# Patient Record
Sex: MALE
[De-identification: names, ages, dates, MRNs, and addresses within clinical notes are randomized per-mention and may not be internally consistent; named-entity substitution may affect disease eponyms.]

---

## 2019-12-27 ENCOUNTER — HOSPITAL ENCOUNTER (EMERGENCY)
Dept: HOSPITAL 97 - ER | Age: 19
Discharge: HOME | End: 2019-12-27
Payer: COMMERCIAL

## 2019-12-27 VITALS — SYSTOLIC BLOOD PRESSURE: 151 MMHG | OXYGEN SATURATION: 100 % | DIASTOLIC BLOOD PRESSURE: 85 MMHG | TEMPERATURE: 98.8 F
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
## 2019-12-27 DIAGNOSIS — S81.831A: Primary | ICD-10-CM

## 2019-12-27 PROCEDURE — 99283 EMERGENCY DEPT VISIT LOW MDM: CPT

## 2019-12-27 NOTE — ER
Nurse's Notes                                                                                     

 [facility]                                                                 

Name: Dusty Singh                                                                                 

Age: 19 yrs                                                                                       

Sex: Male                                                                                         

: 2000                                                                                   

MRN: [id number]                                                                                   

Arrival Date: 2019                                                                          

Time: 15:40                                                                                       

Account#: [id number]                                                                            

Bed 14                                                                                            

Private MD:                                                                                       

Diagnosis: Bitten by other marine mammals                                                         

                                                                                                  

Presentation:                                                                                     

                                                                                             

16:15 Presenting complaint: Patient states: cat fish mauricio to R calf around 1000 this morning  ch  

      while on a boat. Transition of care: patient was not received from another setting of       

      care. Onset of symptoms was 2019 at 10:00. Risk Assessment: Do you want to     

      hurt yourself or someone else? Patient reports no desire to harm self or others.            

      Initial Sepsis Screen: Does the patient meet any 2 criteria? No. Patient's initial          

      sepsis screen is negative. Does the patient have a suspected source of infection? No.       

      Patient's initial sepsis screen is negative. Care prior to arrival: None.                   

16:15 Method Of Arrival: Ambulatory                                                             

16:15 Acuity: TONA 3                                                                           ch  

                                                                                                  

Triage Assessment:                                                                                

16:16 General: Appears in no apparent distress. comfortable, Behavior is calm, cooperative,   ch  

      appropriate for age. Pain: Complains of pain in right calf and medial aspect of right       

      calf Pain currently is 4 out of 10 on a pain scale. Neuro: No deficits noted.               

                                                                                                  

Historical:                                                                                       

- Allergies:                                                                                      

16:16 No Known Allergies;                                                                       

- Home Meds:                                                                                      

16:16 None [Active];                                                                          ch  

- PMHx:                                                                                           

16:16 None;                                                                                   ch  

- PSHx:                                                                                           

16:16 None;                                                                                     

                                                                                                  

- Immunization history:: Adult Immunizations up to date, Flu vaccine is not up to date.           

- Social history:: Smoking status: Patient/guardian denies using tobacco, Patient uses            

  alcohol, occasionally. Patient/guardian denies using street drugs.                              

- Ebola Screening: : Patient negative for fever greater than or equal to 101.5 degrees            

  Fahrenheit, and additional compatible Ebola Virus Disease symptoms Patient denies               

  exposure to infectious person Patient denies travel to an Ebola-affected area in the            

  21 days before illness onset No symptoms or risks identified at this time.                      

                                                                                                  

                                                                                                  

Screenin:36 Abuse screen: Denies threats or abuse. Nutritional screening: No deficits noted.        tr5 

      Tuberculosis screening: No symptoms or risk factors identified. Fall Risk None              

      identified.                                                                                 

                                                                                                  

Assessment:                                                                                       

16:36 General: Appears in no apparent distress. Behavior is calm, cooperative, appropriate    tr5 

      for age. Pain: Complains of pain in right leg. Neuro: Level of Consciousness is awake,      

      alert, obeys commands, Oriented to person, place, time,  are equal bilaterally         

      Moves all extremities. Cardiovascular: Heart tones present Capillary refill < 3             

      seconds. Respiratory: Airway is patent Respiratory effort is even, unlabored,               

      Respiratory pattern is regular, symmetrical. GI: No signs and/or symptoms were reported     

      involving the gastrointestinal system. : No signs and/or symptoms were reported           

      regarding the genitourinary system. EENT: No signs and/or symptoms were reported            

      regarding the EENT system. Derm: Wound noted right leg. Musculoskeletal: No signs           

      and/or symptoms reported regarding the musculoskeletal system.                              

                                                                                                  

Vital Signs:                                                                                      

16:16  / 85; Pulse 92; Resp 16; Temp 98.8; Pulse Ox 100% on R/A; Weight 83.91 kg;       ch  

      Height 5 ft. 10 in. (177.80 cm); Pain 1/10;                                                 

16:16 Body Mass Index 26.54 (83.91 kg, 177.80 cm)                                               

                                                                                                  

ED Course:                                                                                        

15:40 Patient arrived in ED.                                                                  ag5 

15:50 Joseph Vogel PA is PHCP.                                                              [facility] 

15:50 Mathew Brunner MD is Attending Physician.                                              [facility] 

16:15 Triage completed.                                                                         

16:16 Arm band placed on left wrist. Patient placed in an exam room, on a stretcher.            

16:36 Dash Jennings, RN is Primary Nurse.                                                 tr5 

16:36 Awaiting for x-ray.                                                                     tr5 

16:36 Bed in low position. Call light in reach. Side rails up X 1.                            tr5 

16:50 Tib Fib Right XRAY In Process Unspecified.                                              EDMS

17:53 No provider procedures requiring assistance completed. Patient did not have IV access   tr5 

      during this emergency room visit.                                                           

                                                                                                  

Administered Medications:                                                                         

17:26 Drug: Lidocaine (1 %) 20 ml Volume: 20 ml; Route: Infiltration;                         tr5 

17:52 Drug: Doxycycline 100 mg Route: PO;                                                     tr5 

17:53 Follow up: Response: Medication administered at discharge.                              tr5 

17:53 Drug: Bactrim (160 mg-800 mg (DS) 1 tablet Route: PO;                                   tr5 

17:53 Follow up: Response: Medication administered at discharge.                              tr5 

                                                                                                  

                                                                                                  

Outcome:                                                                                          

17:37 Discharge ordered by MD. harmon 

17:53 Discharged to home ambulatory.                                                          tr5 

17:53 Condition: stable                                                                           

17:53 Discharge instructions given to patient, family, Instructed on discharge instructions,      

      follow up and referral plans. medication usage, Demonstrated understanding of               

      instructions, follow-up care, medications, Prescriptions given X 2.                         

17:54 Patient left the ED.                                                                    tr5 

                                                                                                  

Signatures:                                                                                       

Dispatcher [employer]                           EDSarah Arboleda, RN                  RN                                                      

Joseph Vogel PA PA jmm Gaskin, Ajare                                [facility]                                                  

Dash Jennings RN                   RN   tr5                                                  

                                                                                                  

**************************************************************************************************

## 2019-12-27 NOTE — RAD REPORT
EXAM DESCRIPTION:  RAD - Tib Fib Right - 12/27/2019 4:50 pm

 

CLINICAL HISTORY:  Right leg pain

 

FINDINGS:  No acute fracture

 

Radiopaque foreign body is not seen

## 2019-12-27 NOTE — EDPHYS
Physician Documentation                                                                           

 [facility]                                                                 

Name: Dusty Singh                                                                                 

Age: 19 yrs                                                                                       

Sex: Male                                                                                         

: 2000                                                                                   

MRN: [id number]                                                                                   

Arrival Date: 2019                                                                          

Time: 15:40                                                                                       

Account#: [id number]                                                                            

Bed 14                                                                                            

Private MD:                                                                                       

ED Physician Mathew Brunner                                                                       

HPI:                                                                                              

                                                                                             

16:21 This 19 yrs old  Male presents to ER via Ambulatory with complaints of Catfish  jmm 

      Sting.                                                                                      

16:21 The patient presents with an injury, pain. Onset: The symptoms/episode began/occurred   jmm 

      acutely, just prior to arrival. Modifying factors: The symptoms are alleviated by           

      nothing. the symptoms are aggravated by nothing. Associated signs and symptoms:             

      Pertinent positives: swelling, Pertinent negatives fever. This is a 19 year old male        

      with no chronic medical conditions that presents to the ED with complaints of right         

      lower leg pain. Patient was bit by a catfish at approx 1030 am this morning. This           

      occurred in brackish water. Patient denies fever or chills. .                               

                                                                                                  

Historical:                                                                                       

- Allergies:                                                                                      

16:16 No Known Allergies;                                                                     ch  

- Home Meds:                                                                                      

16:16 None [Active];                                                                          ch  

- PMHx:                                                                                           

16:16 None;                                                                                   ch  

- PSHx:                                                                                           

16:16 None;                                                                                   ch  

                                                                                                  

- Immunization history:: Adult Immunizations up to date, Flu vaccine is not up to date.           

- Social history:: Smoking status: Patient/guardian denies using tobacco, Patient uses            

  alcohol, occasionally. Patient/guardian denies using street drugs.                              

- Ebola Screening: : Patient negative for fever greater than or equal to 101.5 degrees            

  Fahrenheit, and additional compatible Ebola Virus Disease symptoms Patient denies               

  exposure to infectious person Patient denies travel to an Ebola-affected area in the            

  21 days before illness onset No symptoms or risks identified at this time.                      

                                                                                                  

                                                                                                  

ROS:                                                                                              

16:21 Constitutional: Negative for fever, chills, and weight loss, Cardiovascular: Negative   jmm 

      for chest pain, palpitations, and edema, Respiratory: Negative for shortness of breath,     

      cough, wheezing, and pleuritic chest pain.                                                  

16:21 MS/extremity: Positive for pain, swelling.                                                  

16:21 Skin: Positive for laceration(s).                                                           

16:21 All other systems are negative.                                                             

                                                                                                  

Exam:                                                                                             

16:21 Constitutional:  This is a well developed, well nourished patient who is awake, alert,  jmm 

      and in no acute distress. Head/Face:  atraumatic. Eyes:  EOMI, no conjunctival erythema     

      appreciated ENT:  Moist Mucus Membranes Neck:  Trachea midline, Supple Chest/axilla:        

      Normal chest wall appearance and motion.   Cardiovascular:  Regular rate and rhythm.        

      No edema appreciated Respiratory:  Normal respirations, no respiratory distress             

      appreciated Abdomen/GI:  Non distended, soft Back:  Normal ROM                              

16:21 Musculoskeletal/extremity: mild swelling and induration noted to the right calf,            

      compartments are soft, NVI.                                                                 

16:21 Skin: .5 cm puncture noted to the right calf, mild bleeding appreciated.                    

16:21 Neuro: Orientation: is normal, Mentation: is normal, Memory: is normal.                     

16:21 Psych: Behavior/mood is pleasant, cooperative.                                              

                                                                                                  

Vital Signs:                                                                                      

16:16  / 85; Pulse 92; Resp 16; Temp 98.8; Pulse Ox 100% on R/A; Weight 83.91 kg;       ch  

      Height 5 ft. 10 in. (177.80 cm); Pain 1/10;                                                 

16:16 Body Mass Index 26.54 (83.91 kg, 177.80 cm)                                             ch  

                                                                                                  

MDM:                                                                                              

16:21 Patient medically screened.                                                             [facility] 

17:30 Data reviewed: vital signs, nurses notes. Counseling: I had a detailed discussion with  [facility] 

      the patient and/or guardian regarding: the historical points, exam findings, and any        

      diagnostic results supporting the discharge/admit diagnosis, radiology results, the         

      need for outpatient follow up, to return to the emergency department if symptoms worsen     

      or persist or if there are any questions or concerns that arise at home. ED course:         

      Patient is alert and non toxic in appearance in the ED. The patient's wound was copious     

      irrigated and the patient was given strict return precautions. Patient understood and       

      agrees with the plan of care. .                                                             

                                                                                                  

                                                                                             

16:29 Order name: Tib Fib Right XRAY; Complete Time: 17:03                                    [facility] 

                                                                                                  

Administered Medications:                                                                         

17:26 Drug: Lidocaine (1 %) 20 ml Volume: 20 ml; Route: Infiltration;                         tr5 

17:52 Drug: Doxycycline 100 mg Route: PO;                                                     tr5 

17:53 Follow up: Response: Medication administered at discharge.                              tr5 

17:53 Drug: Bactrim (160 mg-800 mg (DS) 1 tablet Route: PO;                                   tr5 

17:53 Follow up: Response: Medication administered at discharge.                              tr5 

                                                                                                  

                                                                                                  

Disposition:                                                                                      

19 17:37 Discharged to Home. Impression: Bitten by other marine mammals.                    

- Condition is Stable.                                                                            

- Discharge Instructions: Marine Life Injury.                                                     

- Prescriptions for Doxycycline Hyclate 100 mg Oral Tablet - take 1 tablet by ORAL                

  route every 12 hours; 20 tablet. Bactrim - 160 mg Oral Tablet - take 1 tablet             

  by ORAL route every 12 hours for 10 days; 20 tablet.                                            

- Medication Reconciliation Form, Thank You Letter, Antibiotic Education, Prescription            

  Opioid Use form.                                                                                

- Follow up: Private Physician; When: 2 - 3 days; Reason: Recheck today's complaints,             

  Continuance of care, Re-evaluation by your physician.                                           

                                                                                                  

                                                                                                  

                                                                                                  

Addendum:                                                                                         

2019                                                                                        

     19:36 Co-signature as Attending Physician, Mathew Brunner MD I agree with the assessment and   k
dr

           plan of care.                                                                          

                                                                                                  

Signatures:                                                                                       

Dispatcher [employer]                           EDSarah Arboleda, RN                  RN                                                      

Mathew Brunner MD MD kdr Mickail, Joel, PA PA jmm Rodriguez, Tommie RN                   RN   tr5                                                  

                                                                                                  

Corrections: (The following items were deleted from the chart)                                    

                                                                                             

17:54 17:37 2019 17:37 Discharged to Home. Impression: Bitten by other marine mammals.  tr5 

      Condition is Stable. Forms are Medication Reconciliation Form, Thank You Letter,            

      Antibiotic Education, Prescription Opioid Use. Follow up: Private Physician; When: 2 -      

      3 days; Reason: Recheck today's complaints, Continuance of care, Re-evaluation by your      

      physician. [place]                                                                              

                                                                                                  

**************************************************************************************************